# Patient Record
Sex: FEMALE | Race: WHITE | NOT HISPANIC OR LATINO | URBAN - METROPOLITAN AREA
[De-identification: names, ages, dates, MRNs, and addresses within clinical notes are randomized per-mention and may not be internally consistent; named-entity substitution may affect disease eponyms.]

---

## 2017-01-09 PROBLEM — 266464001 HEMORRHAGE OF RECTUM AND ANUS: Status: ACTIVE | Noted: 2017-01-09

## 2017-01-09 PROBLEM — 236060008 SYMPTOMATIC DISORDERS OF THE GASTROINTESTINAL TRACT: Status: ACTIVE | Noted: 2017-01-09

## 2022-04-30 ENCOUNTER — TELEPHONE ENCOUNTER (OUTPATIENT)
Dept: URBAN - METROPOLITAN AREA CLINIC 121 | Facility: CLINIC | Age: 27
End: 2022-04-30

## 2022-05-01 ENCOUNTER — TELEPHONE ENCOUNTER (OUTPATIENT)
Dept: URBAN - METROPOLITAN AREA CLINIC 121 | Facility: CLINIC | Age: 27
End: 2022-05-01

## 2022-05-01 RX ORDER — IBUPROFEN 100 MG/5ML
SUSPENSION ORAL
Status: ACTIVE | COMMUNITY
Start: 2017-01-09

## 2022-05-01 RX ORDER — CALCIUM CARBONATE 160(400)MG
TABLET,CHEWABLE ORAL
Status: ACTIVE | COMMUNITY
Start: 2017-01-09

## 2025-02-13 ENCOUNTER — OFFICE VISIT (OUTPATIENT)
Dept: URBAN - METROPOLITAN AREA CLINIC 80 | Facility: CLINIC | Age: 30
End: 2025-02-13
Payer: COMMERCIAL

## 2025-02-13 ENCOUNTER — DASHBOARD ENCOUNTERS (OUTPATIENT)
Age: 30
End: 2025-02-13

## 2025-02-13 VITALS
WEIGHT: 155.4 LBS | BODY MASS INDEX: 30.51 KG/M2 | TEMPERATURE: 97.7 F | SYSTOLIC BLOOD PRESSURE: 120 MMHG | HEIGHT: 60 IN | DIASTOLIC BLOOD PRESSURE: 82 MMHG | HEART RATE: 80 BPM

## 2025-02-13 DIAGNOSIS — R19.5 LOOSE STOOLS: ICD-10-CM

## 2025-02-13 DIAGNOSIS — R15.2 DEFECATION URGENCY: ICD-10-CM

## 2025-02-13 PROCEDURE — 99203 OFFICE O/P NEW LOW 30 MIN: CPT | Performed by: INTERNAL MEDICINE

## 2025-02-13 RX ORDER — ETONOGESTREL 68 MG/1
AS DIRECTED IMPLANT SUBCUTANEOUS
Status: ACTIVE | COMMUNITY
Start: 2025-02-13

## 2025-02-13 RX ORDER — BENZONATATE 100 MG/1
CAPSULE ORAL
Qty: 21 CAPSULE | Status: ACTIVE | COMMUNITY

## 2025-02-13 NOTE — HPI-TODAY'S VISIT:
30 yo F Having "way too many BM" Dealing with it for a long time Admits she shouldn't have waited so long  Issue: every time she eats: bunch of movement and has to go to the BR quickly To the point that if she delays: cold sweats and pain Almost feels dizzi/it's horrible.  Most concerning.  L sided abd pain. Comes and goes. Lasts a couple of days.  Hurts more with walking/movement.   No bleeding/fever chills. Consistancy varries from soft solid, can be very long.  Frequncy varies.  Eats salad every day but not sure as to exact amount of fiber she gets

## 2025-02-14 LAB
ABSOLUTE BASOPHILS: 56
ABSOLUTE EOSINOPHILS: 121
ABSOLUTE LYMPHOCYTES: 1581
ABSOLUTE MONOCYTES: 567
ABSOLUTE NEUTROPHILS: 6975
ALBUMIN/GLOBULIN RATIO: 2.2
ALBUMIN: 5.1
ALKALINE PHOSPHATASE: 73
ALT: 13
AST: 15
BASOPHILS: 0.6
BILIRUBIN, TOTAL: 0.3
BUN/CREATININE RATIO: (no result)
CALCIUM: 9.9
CARBON DIOXIDE: 28
CHLORIDE: 100
CREATININE: 0.68
EOSINOPHILS: 1.3
FREE THYROXINE INDEX: 1.8
GLOBULIN: 2.3
GLUCOSE: 91
HEMATOCRIT: 43.5
HEMOGLOBIN: 14.7
IMMUNOGLOBULIN A: 87
INTERPRETATION: (no result)
LYMPHOCYTES: 17
MCH: 30.9
MCHC: 33.8
MCV: 91.4
MONOCYTES: 6.1
MPV: 10.7
NEUTROPHILS: 75
PLATELET COUNT: 309
POTASSIUM: 4.5
PROTEIN, TOTAL: 7.4
RDW: 12.1
RED BLOOD CELL COUNT: 4.76
SODIUM: 138
T3 UPTAKE: 30
THYROXINE (T4): 6
TISSUE TRANSGLUTAMINASE AB, IGA: <1
TSH: 1.2
UREA NITROGEN (BUN): 14
WHITE BLOOD CELL COUNT: 9.3

## 2025-02-18 LAB
ADENOVIRUS F 40/41: NOT DETECTED
CALPROTECTIN, STOOL - QDX: (no result)
CAMPYLOBACTER: NOT DETECTED
CLOSTRIDIUM DIFFICILE: NOT DETECTED
ENTAMOEBA HISTOLYTICA: NOT DETECTED
ENTEROAGGREGATIVE E.COLI: NOT DETECTED
ENTEROTOXIGENIC E.COLI: NOT DETECTED
ESCHERICHIA COLI O157: NOT DETECTED
GIARDIA LAMBLIA: NOT DETECTED
NOROVIRUS GI/GII: NOT DETECTED
PANCREATICELASTASE ELISA, STOOL: (no result)
ROTAVIRUS A: NOT DETECTED
SALMONELLA SPP.: NOT DETECTED
SHIGA-LIKE TOXIN PRODUCING E.COLI: NOT DETECTED
SHIGELLA SPP. / ENTEROINVASIVE E.COLI: NOT DETECTED
VIBRIO PARAHAEMOLYTICUS: NOT DETECTED
VIBRIO SPP.: NOT DETECTED
YERSINIA ENTEROCOLITICA: NOT DETECTED

## 2025-02-19 ENCOUNTER — TELEPHONE ENCOUNTER (OUTPATIENT)
Dept: URBAN - METROPOLITAN AREA CLINIC 80 | Facility: CLINIC | Age: 30
End: 2025-02-19

## 2025-02-19 RX ORDER — RIFAXIMIN 550 MG/1
1 TABLET TABLET ORAL THREE TIMES A DAY
Qty: 42 TABLET | Refills: 1 | OUTPATIENT
Start: 2025-02-19 | End: 2025-03-19

## 2025-02-20 ENCOUNTER — TELEPHONE ENCOUNTER (OUTPATIENT)
Dept: URBAN - METROPOLITAN AREA CLINIC 80 | Facility: CLINIC | Age: 30
End: 2025-02-20

## 2025-03-05 ENCOUNTER — CLAIMS CREATED FROM THE CLAIM WINDOW (OUTPATIENT)
Dept: URBAN - METROPOLITAN AREA SURGERY CENTER 19 | Facility: SURGERY CENTER | Age: 30
End: 2025-03-05
Payer: COMMERCIAL

## 2025-03-05 ENCOUNTER — CLAIMS CREATED FROM THE CLAIM WINDOW (OUTPATIENT)
Dept: URBAN - METROPOLITAN AREA CLINIC 4 | Facility: CLINIC | Age: 30
End: 2025-03-05
Payer: COMMERCIAL

## 2025-03-05 DIAGNOSIS — K52.832 LYMPHOCYTIC COLITIS: ICD-10-CM

## 2025-03-05 DIAGNOSIS — K63.89 OTHER SPECIFIED DISEASES OF INTESTINE: ICD-10-CM

## 2025-03-05 DIAGNOSIS — R19.7 ACUTE DIARRHEA: ICD-10-CM

## 2025-03-05 PROCEDURE — 88305 TISSUE EXAM BY PATHOLOGIST: CPT | Performed by: PATHOLOGY

## 2025-03-05 PROCEDURE — 45380 COLONOSCOPY AND BIOPSY: CPT | Performed by: INTERNAL MEDICINE

## 2025-03-05 PROCEDURE — 00811 ANES LWR INTST NDSC NOS: CPT | Performed by: NURSE ANESTHETIST, CERTIFIED REGISTERED

## 2025-03-05 RX ORDER — RIFAXIMIN 550 MG/1
1 TABLET TABLET ORAL THREE TIMES A DAY
Qty: 42 TABLET | Refills: 1 | Status: ACTIVE | COMMUNITY
Start: 2025-02-19 | End: 2025-03-19

## 2025-03-05 RX ORDER — BENZONATATE 100 MG/1
CAPSULE ORAL
Qty: 21 CAPSULE | Status: ACTIVE | COMMUNITY

## 2025-03-05 RX ORDER — ETONOGESTREL 68 MG/1
AS DIRECTED IMPLANT SUBCUTANEOUS
Status: ACTIVE | COMMUNITY
Start: 2025-02-13

## 2025-03-07 ENCOUNTER — TELEPHONE ENCOUNTER (OUTPATIENT)
Dept: URBAN - METROPOLITAN AREA CLINIC 80 | Facility: CLINIC | Age: 30
End: 2025-03-07

## 2025-04-14 ENCOUNTER — OFFICE VISIT (OUTPATIENT)
Dept: URBAN - METROPOLITAN AREA TELEHEALTH 2 | Facility: TELEHEALTH | Age: 30
End: 2025-04-14
Payer: COMMERCIAL

## 2025-04-14 DIAGNOSIS — K52.832 LYMPHOCYTIC COLITIS: ICD-10-CM

## 2025-04-14 PROBLEM — 1187071008: Status: ACTIVE | Noted: 2025-04-14

## 2025-04-14 PROCEDURE — 99214 OFFICE O/P EST MOD 30 MIN: CPT | Performed by: INTERNAL MEDICINE

## 2025-04-14 RX ORDER — BENZONATATE 100 MG/1
CAPSULE ORAL
Qty: 21 CAPSULE | COMMUNITY

## 2025-04-14 RX ORDER — COLESEVELAM HYDROCHLORIDE 625 MG/1
2 TABLETS WITH MEALS TABLET, FILM COATED ORAL TWICE A DAY
Qty: 120 TABLET | Refills: 1 | OUTPATIENT
Start: 2025-04-14

## 2025-04-14 RX ORDER — ETONOGESTREL 68 MG/1
AS DIRECTED IMPLANT SUBCUTANEOUS
Status: ACTIVE | COMMUNITY
Start: 2025-02-13

## 2025-04-14 NOTE — HPI-TODAY'S VISIT:
Colonoscopy lymphocytic colitis  Xifaxan tried no improvement  BM average 4-10 x daily very loose. No bleeding. No weight loss.

## 2025-04-23 ENCOUNTER — TELEPHONE ENCOUNTER (OUTPATIENT)
Dept: URBAN - METROPOLITAN AREA CLINIC 80 | Facility: CLINIC | Age: 30
End: 2025-04-23

## 2025-08-01 ENCOUNTER — CLAIMS CREATED FROM THE CLAIM WINDOW (OUTPATIENT)
Dept: URBAN - METROPOLITAN AREA MEDICAL CENTER 39 | Facility: MEDICAL CENTER | Age: 30
End: 2025-08-01
Payer: COMMERCIAL

## 2025-08-01 DIAGNOSIS — K20.80 OTHER ESOPHAGITIS: ICD-10-CM

## 2025-08-01 DIAGNOSIS — K29.50 CHRONIC GASTRITIS: ICD-10-CM

## 2025-08-01 DIAGNOSIS — R10.13 EPIGASTRIC ABDOMINAL PAIN: ICD-10-CM

## 2025-08-01 DIAGNOSIS — K31.89 OTHER DISEASES OF STOMACH AND DUODENUM: ICD-10-CM

## 2025-08-01 PROCEDURE — 43235 EGD DIAGNOSTIC BRUSH WASH: CPT | Performed by: INTERNAL MEDICINE

## 2025-08-01 PROCEDURE — 43239 EGD BIOPSY SINGLE/MULTIPLE: CPT | Performed by: INTERNAL MEDICINE

## 2025-08-13 ENCOUNTER — OFFICE VISIT (OUTPATIENT)
Dept: URBAN - METROPOLITAN AREA TELEHEALTH 2 | Facility: TELEHEALTH | Age: 30
End: 2025-08-13
Payer: COMMERCIAL

## 2025-08-13 ENCOUNTER — OFFICE VISIT (OUTPATIENT)
Dept: URBAN - METROPOLITAN AREA TELEHEALTH 2 | Facility: TELEHEALTH | Age: 30
End: 2025-08-13

## 2025-08-13 DIAGNOSIS — K20.90 ESOPHAGITIS: ICD-10-CM

## 2025-08-13 DIAGNOSIS — K52.832 LYMPHOCYTIC COLITIS: ICD-10-CM

## 2025-08-13 DIAGNOSIS — R07.89 ATYPICAL CHEST PAIN: ICD-10-CM

## 2025-08-13 PROCEDURE — 99212 OFFICE O/P EST SF 10 MIN: CPT | Performed by: INTERNAL MEDICINE

## 2025-08-13 PROCEDURE — 99442 PHONE E/M BY PHYS 11-20 MIN: CPT | Performed by: INTERNAL MEDICINE

## 2025-08-13 RX ORDER — ETONOGESTREL 68 MG/1
AS DIRECTED IMPLANT SUBCUTANEOUS
Status: ACTIVE | COMMUNITY
Start: 2025-02-13

## 2025-08-13 RX ORDER — COLESEVELAM HYDROCHLORIDE 625 MG/1
2 TABLETS WITH MEALS TABLET, FILM COATED ORAL TWICE A DAY
Qty: 120 TABLET | Refills: 5 | OUTPATIENT
Start: 2025-08-13

## 2025-08-13 RX ORDER — PANTOPRAZOLE SODIUM 40 MG/1
1 TABLET 1/2 TO 1 HOUR BEFORE MORNING MEAL TABLET, DELAYED RELEASE ORAL ONCE A DAY
Qty: 30 | OUTPATIENT
Start: 2025-08-13

## 2025-08-13 RX ORDER — COLESEVELAM HYDROCHLORIDE 625 MG/1
2 TABLETS WITH MEALS TABLET, FILM COATED ORAL TWICE A DAY
Qty: 120 TABLET | Refills: 1 | Status: ACTIVE | COMMUNITY
Start: 2025-04-14

## 2025-08-13 RX ORDER — BENZONATATE 100 MG/1
CAPSULE ORAL
Qty: 21 CAPSULE | COMMUNITY

## 2025-08-29 ENCOUNTER — TELEPHONE ENCOUNTER (OUTPATIENT)
Dept: URBAN - METROPOLITAN AREA CLINIC 80 | Facility: CLINIC | Age: 30
End: 2025-08-29

## 2025-08-29 RX ORDER — PANTOPRAZOLE SODIUM 40 MG/1
1 TABLET 1/2 TO 1 HOUR BEFORE MORNING MEAL TABLET, DELAYED RELEASE ORAL ONCE A DAY
Qty: 90 | Refills: 3
Start: 2025-08-13